# Patient Record
Sex: MALE | Race: WHITE | HISPANIC OR LATINO | URBAN - METROPOLITAN AREA
[De-identification: names, ages, dates, MRNs, and addresses within clinical notes are randomized per-mention and may not be internally consistent; named-entity substitution may affect disease eponyms.]

---

## 2017-08-30 ENCOUNTER — CLINICAL SUPPORT (OUTPATIENT)
Dept: OCCUPATIONAL MEDICINE | Facility: CLINIC | Age: 22
End: 2017-08-30

## 2017-08-30 DIAGNOSIS — Z02.1 PHYSICAL EXAM, PRE-EMPLOYMENT: Primary | ICD-10-CM

## 2017-08-30 LAB — GLUCOSE SERPL-MCNC: 353 MG/DL (ref 70–110)

## 2017-08-30 PROCEDURE — 76140 X-RAY CONSULTATION: CPT | Mod: S$GLB,,, | Performed by: NURSE PRACTITIONER

## 2017-08-30 PROCEDURE — 99499 UNLISTED E&M SERVICE: CPT | Mod: S$GLB,,, | Performed by: NURSE PRACTITIONER

## 2017-08-30 PROCEDURE — 94010 BREATHING CAPACITY TEST: CPT | Mod: S$GLB,,, | Performed by: NURSE PRACTITIONER

## 2017-08-30 PROCEDURE — 99002 DEVICE HANDLING PHYS/QHP: CPT | Mod: S$GLB,,, | Performed by: NURSE PRACTITIONER

## 2017-08-30 NOTE — PROGRESS NOTES
See Media , Pt here for PE  Patient to return to the clinic.  Recommend f/u with pcp abnormal UA and NFBS